# Patient Record
Sex: FEMALE | Race: BLACK OR AFRICAN AMERICAN | NOT HISPANIC OR LATINO | Employment: PART TIME | URBAN - METROPOLITAN AREA
[De-identification: names, ages, dates, MRNs, and addresses within clinical notes are randomized per-mention and may not be internally consistent; named-entity substitution may affect disease eponyms.]

---

## 2020-11-18 PROBLEM — I10 HYPERTENSION: Status: ACTIVE | Noted: 2020-11-18

## 2022-06-27 PROBLEM — F41.9 ANXIETY: Status: ACTIVE | Noted: 2022-06-27

## 2024-09-22 ENCOUNTER — TELEPHONE (OUTPATIENT)
Dept: PHARMACY | Facility: CLINIC | Age: 64
End: 2024-09-22

## 2024-09-22 NOTE — TELEPHONE ENCOUNTER
[facility]/Population Health Chart Review & Patient Outreach Details For Medication Adherence Project    Reason for Outreach Encounter: 3rd Party payor non-compliance report ([employer], [employer], C, etc)  2.  Patient Outreach Method: Reviewed Patient Chart  3.   Medication in question: telmisartan   LAST FILLED: 6/7/24 for 90 day supply  Hypertension Medications               hydroCHLOROthiazide (HYDRODIURIL) 25 MG tablet Take 1 tablet (25 mg total) by mouth once daily.    metoprolol succinate (TOPROL-XL) 100 MG 24 hr tablet Take 1 tablet (100 mg total) by mouth once daily.    NIFEdipine (PROCARDIA-XL) 30 MG (OSM) 24 hr tablet Take 1 tablet (30 mg total) by mouth once daily.    telmisartan (MICARDIS) 80 MG Tab Take 1 tablet (80 mg total) by mouth once daily.              4.  Reviewed and or Updates Made To: Patient Chart  5. Outreach Outcomes and/or actions taken: Patient filled medication and is on track to be adherent